# Patient Record
(demographics unavailable — no encounter records)

---

## 2024-10-17 NOTE — HISTORY OF PRESENT ILLNESS
[FreeTextEntry1] : 65-year-old male with a past medical history of diabetes complains of bilateral arm/hand numbness and tingling. He denies a fall.  He denies neck pain.  In the right arm he has tingling only in the hand and at the elbow.  On the left arm he has tingling from the shoulder to the hand. Use of the arms or hands does not increase his symptoms.  He does not take pain medication.  He does not have numbness in his feet

## 2024-10-17 NOTE — REVIEW OF SYSTEMS
[Fever] : no fever [Eye Pain] : no eye pain [Earache] : no earache [Chest Pain] : no chest pain [Shortness Of Breath] : no shortness of breath [Abdominal Pain] : no abdominal pain [Dysuria] : no dysuria [Joint Pain] : no joint pain [Skin Rash] : no skin rash [Headache] : no headaches [Suicidal] : not suicidal [Easy Bleeding] : no tendency for easy bleeding [de-identified] : Numbness and tingling in both arms

## 2024-10-17 NOTE — REVIEW OF SYSTEMS
[Fever] : no fever [Eye Pain] : no eye pain [Earache] : no earache [Chest Pain] : no chest pain [Shortness Of Breath] : no shortness of breath [Abdominal Pain] : no abdominal pain [Dysuria] : no dysuria [Joint Pain] : no joint pain [Skin Rash] : no skin rash [Headache] : no headaches [Suicidal] : not suicidal [Easy Bleeding] : no tendency for easy bleeding [de-identified] : Numbness and tingling in both arms

## 2024-10-17 NOTE — REVIEW OF SYSTEMS
[Fever] : no fever [Eye Pain] : no eye pain [Earache] : no earache [Chest Pain] : no chest pain [Shortness Of Breath] : no shortness of breath [Abdominal Pain] : no abdominal pain [Dysuria] : no dysuria [Joint Pain] : no joint pain [Skin Rash] : no skin rash [Headache] : no headaches [Suicidal] : not suicidal [Easy Bleeding] : no tendency for easy bleeding [de-identified] : Numbness and tingling in both arms

## 2024-10-17 NOTE — REVIEW OF SYSTEMS
[Fever] : no fever [Eye Pain] : no eye pain [Earache] : no earache [Chest Pain] : no chest pain [Shortness Of Breath] : no shortness of breath [Abdominal Pain] : no abdominal pain [Dysuria] : no dysuria [Joint Pain] : no joint pain [Skin Rash] : no skin rash [Headache] : no headaches [Suicidal] : not suicidal [Easy Bleeding] : no tendency for easy bleeding [de-identified] : Numbness and tingling in both arms

## 2024-10-17 NOTE — PHYSICAL EXAM
[FreeTextEntry1] : Pleasant, in no distress. Language: English HEENT: Head: no trauma. Eyes: no discharge. Ears: No discharge. Nose No discharge. Throat: clear Neck: FAROM. Negative Spurlings Heart: RR, +S1, S2 Lungs: CTA Abdomen: soft, NT Lumbar spine: FAROM, no spasm  LUE: Shoulder:FAROM, MS 5/5 Elbow: FAROM, MS 5/5 reflexes 2/4 negative Tinel's. Wrist: FAROM, MS 5/5 reflexes 2/4 negative Tinel's.  No hand wasting Warm, nontender, pulse 2+  RUE:Shoulder:FAROM, MS 5/5 Elbow: FAROM, MS 5/5 reflexes 2/4 negative Tinel's. Wrist: FAROM, MS 5/5 reflexes 2/4 negative Tinel's.  No hand wasting Warm, nontender, pulse 2+  LLE: Hip: FAROM, MS 5/5 Knee: FAROM, MS 5/5 reflexes 2/4 Ankle: FAROM, MS 5/5 reflexes 2/4 Warm , nontender, pulse 2+ negative homans  RLE: Hip: FAROM, MS 5/5 Knee: FAROM, MS 5/5 reflexes 2/4 Ankle: FAROM, MS 5/5 reflexes 2/4 Warm , nontender, pulse 2+ negative homans  Gait: Spontaneous, reciprocal, safe without an assistive device  Sensation RUE: sensation is intact to light touch, pinprick  and proprioception LUE: sensation is intact to light touch, pinprick  and proprioception RLE: sensation is intact to light touch, pinprick  and proprioception. Neg SLR. Neg JAKUB, Neg ARMINIR LLE: sensation is intact to light touch, pinprick  and proprioception. Neg SLR. Neg JAKUB, Neg FADIR

## 2024-11-12 NOTE — ASSESSMENT
[FreeTextEntry1] : Preliminary report  Impression: Diffuse upper extremity sensory neuropathy Bilateral carpal tunnel syndrome  Recommendations: Continue high potency B multivitamins with folic acid daily for nerve metabolism support Neurology evaluation to assist with the diagnosis of diffuse sensory neuropathy Patient would benefit from use of bilateral carpal tunnel splints.  Full report to follow

## 2024-11-15 NOTE — REVIEW OF SYSTEMS
[Fever] : no fever [Eye Pain] : no eye pain [Earache] : no earache [Chest Pain] : no chest pain [Shortness Of Breath] : no shortness of breath [Abdominal Pain] : no abdominal pain [Dysuria] : no dysuria [Joint Pain] : no joint pain [Skin Rash] : no skin rash [Headache] : no headaches [Suicidal] : not suicidal [Easy Bleeding] : no tendency for easy bleeding [de-identified] : Numbness and tingling in both arms

## 2024-11-15 NOTE — HISTORY OF PRESENT ILLNESS
[FreeTextEntry1] : 65-year-old male with a past medical history of diabetes complains of bilateral arm/hand numbness and tingling.  He denies a fall.  He denies neck pain.  In the right arm he has tingling only in the hand and at the elbow.  On the left arm he has tingling from the shoulder to the hand. Use of the arms or hands does not increase his symptoms.  He does not take pain medication.  He does not have numbness in his feet No EtOH.  He has a history of diabetes.  No thyroid disease.  Returns today to discuss the results of the EMG and further interventions. The EMG/NCV reveals diffuse sensory neuropathy and mild bilateral carpal tunnel syndrome

## 2024-11-15 NOTE — DATA REVIEWED
[EMG] : EMG [FreeTextEntry1] : EMG/NCV of the upper extremities performed on November 7, 2024 reveals diffuse sensory neuropathy and bilateral mild carpal tunnel syndrome

## 2024-11-15 NOTE — REVIEW OF SYSTEMS
[Fever] : no fever [Eye Pain] : no eye pain [Earache] : no earache [Chest Pain] : no chest pain [Shortness Of Breath] : no shortness of breath [Abdominal Pain] : no abdominal pain [Dysuria] : no dysuria [Joint Pain] : no joint pain [Skin Rash] : no skin rash [Headache] : no headaches [Suicidal] : not suicidal [Easy Bleeding] : no tendency for easy bleeding [de-identified] : Numbness and tingling in both arms

## 2024-11-18 NOTE — PLAN
[FreeTextEntry1] : Will encourage proper eating habits and identifying stressors/triggers for poor eating habits and implementing appropriate behavioral changes   RD Evaluation - [place]   Medical Weight Management Evaluation - Hi   Preoperative Medical evaluations - Primary Care Physician, Pulmonologist, Cardiologist, Behavioral Health   Preoperative Endoscopy   Investigations - Micronutrient Levels, Ultrasound  F/U once comprehensive assessment complete and dietary counseling sufficient to plan for surgery   45 minutes total spent with patients discussing importance of weight loss and path to bariatric surgery.

## 2024-11-18 NOTE — SOCIAL HISTORY
[] :  [# Of Children ___] : has [unfilled] children [FreeTextEntry1] : lives with his wife and his 20 y.o. 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 in [place] [FreeTextEntry5] : Witnessed civil war in [place]. Pt. endorsed some PTSD symptoms in the past but not currently.

## 2024-11-18 NOTE — HISTORY OF PRESENT ILLNESS
[Genetics] : genetics [Decrease Activity] : decrease activity [Poor Choices] : poor choices [de-identified] : Pt.'s motivation for surgery is to improve his health (pt. has HTN, DANNIE, and DM) and improve his mobility. He has difficulty bending down and working as a .  Per pt., his highest weight was 225 lbs. in January and his lowest weight was 99 lbs. when he was 22 y.o. His stated goal weight is 140-150 lbs. and he expresses intent to make behavioral and dietary changes towards obtaining optimal results.    [de-identified] : sleeve gastrectomy  [de-identified] : over 10 years; speaking with others who have had bariatric surgery; discussions with surgeon   [de-identified] : none.  Pt denies ED hx and bxs, including binge eating. [de-identified] : A current typical day of eating is reported as follows: B: (4:30 a.m). coffee, scooped out bagel with avacado, or corn tortilla with cheese S: (9:00 a.m.) coffee, every other week may have a bagel once during the week L: (between 12 and 2 p.m.) salmon with green bananas or green cassava; or chicken with salad from home; or orders a taco S: granola bar or cashews sometimes D: (between 6 and 7 p.m.) chicken or steak or fish and vegetables Drinks coffee, water, orange or cranberry juice sometimes His diet history is indicated as cutting portion sizes and eating low fat foods. Despite multiple attempts at diet and exercise modifications, patient reports inability to achieve significant weight loss.

## 2024-11-18 NOTE — DISCUSSION/SUMMARY
[Educational materials provided] : Educational materials provided [Behavior plan developed] : Behavior plan developed [Strategies to improve adherence identified] : Strategies to improve adherence identified [Addtnl Health & Behavior Intervention: Individual] : Additional Health and Behavior Intervention: Individual [Addtnl Health & Behavior Intervention: Group] : Additional Health and Behavior Intervention: Group [Develop and refine illness management to behavior plan] : Develop and refine illness management to behavior plan [Identify, practice refine strategies to promote adherence to regimen] : Identify, practice refine strategies to promote adherence to regimen [FreeTextEntry1] : Based on the information presented in this assessment, Mr. GARCIA does not have any current psychological contraindications for bariatric surgery.   [de-identified] : Psychological factors (poor dietary choices) affecting other medical conditions (obesity and associated medical sequelae) Obesity [FreeTextEntry3] : Behavioral strategies were discussed to increase his coping skills and assist him in making lifestyle modifications.  Provided psychoeducational materials on changing eating behaviors in preparation for surgery.  It was recommended that he attend the post-surgery group sessions for further education and support to assist him in making lifestyle changes.  Additionally, it was recommended that he utilize writer and RD as needed to assist in making pre-surgical and post-surgical dietary and behavioral changes.     [FreeTextEntry5] : compliance with dietary and behavioral recommendations [FreeTextEntry6] : reduction of obesity related co-morbidities; reduced risk of further medical sequelae of obesity

## 2024-11-18 NOTE — HISTORY OF PRESENT ILLNESS
[de-identified] : Initial Bariatric Surgery Evaluation   Patient is a  65 year  y/o male with a BMI of 36 and the following obesity related comorbidities: DM, HTN, HLD.  He has been struggling with weight for years and has failed multiple attempts at non-surgical weight loss options..  He  presents for bariatric surgery evaluation.   Current Weight: 205 Highest Weight in the last 5 years: 225  Lowest Weight in the last 5 years: 205  Symptoms of GERD: Occasional  Marital Status:  Children: 2 (2 grandchildren Work:     Exercise: limited but very active at work  Alcohol/Illicit Drugs/Tobacco: Denies

## 2024-11-18 NOTE — PHYSICAL EXAM
[Normal] : good [AH] : no auditory hallucinations [VH] : no visual hallucinations [Suicidal Ideation] : no suicidal ideation [Homicidal Ideation] : no homicidal ideation [FreeTextEntry8] : "ready for the day"

## 2024-11-18 NOTE — REASON FOR VISIT
[Other Location: e.g. Home (Enter Location, City,State)___] : at [unfilled] [Patient] : the patient [Initial Consult] : an initial consult for [Morbid Obesity (BMI<40)] : morbid obesity (bmi<40) [Referring By:  ___] : ~[address]~ was referred for psychological evaluation by Dr. PABLO [Attempted Weight Loss] : attempted weight loss [Commitment to Modified Lifestyle] : commitment to a modified lifestyle pre and post surgery [Difficulties with Diet Compliance] : difficulties with diet compliance  [Expectations of Outcome] : expectations of outcome [Motivation for Selecting Surgery] : motivation for selecting surgery [Strength of Social Support System] : strength of social support system [Patient Understands Data May be Shared] : patient understands that the information discussed during the evaluation would be shared with referring provider and possibly with ~his/her~ insurance provider [Other Location: e.g. School (Enter Location, City,State)___] : at [unfilled], at the time of the visit. [de-identified] : for evaluation of psychological appropriateness for bariatric surgery [de-identified] : Confidentiality and its limitations were explained.

## 2024-11-18 NOTE — PLAN
[FreeTextEntry1] : Will encourage proper eating habits and identifying stressors/triggers for poor eating habits and implementing appropriate behavioral changes   RD Evaluation - hSeridan   Medical Weight Management Evaluation - Hi   Preoperative Medical evaluations - Primary Care Physician, Pulmonologist, Cardiologist, Behavioral Health   Preoperative Endoscopy   Investigations - Micronutrient Levels, Ultrasound  F/U once comprehensive assessment complete and dietary counseling sufficient to plan for surgery   45 minutes total spent with patients discussing importance of weight loss and path to bariatric surgery.

## 2024-11-18 NOTE — ASSESSMENT
[FreeTextEntry1] : Patient is a 65 year y/o male  with a BMI of 36 and DM, HTN, HLD presents for bariatric surgery.  The different types of bariatric surgery were explained to him and the sleeve gastrectomy was deemed the best option.  The patient wishes to enter into our medically supervised weight loss program and undergo appropriate preoperative evaluation for bariatric surgery.

## 2024-11-18 NOTE — HISTORY OF PRESENT ILLNESS
[de-identified] : Initial Bariatric Surgery Evaluation   Patient is a  65 year  y/o male with a BMI of 36 and the following obesity related comorbidities: DM, HTN, HLD.  He has been struggling with weight for years and has failed multiple attempts at non-surgical weight loss options..  He  presents for bariatric surgery evaluation.   Current Weight: 205 Highest Weight in the last 5 years: 225  Lowest Weight in the last 5 years: 205  Symptoms of GERD: Occasional  Marital Status:  Children: 2 (2 grandchildren Work:     Exercise: limited but very active at work  Alcohol/Illicit Drugs/Tobacco: Denies

## 2024-11-19 NOTE — HISTORY OF PRESENT ILLNESS
[Improved Health] : Improved health [Quality of Life] : Quality of life [Improved Mobility] : Improved mobility [Middle Age (45-65)] : middle age (45-65) [Haven't tried anything yet] : haven't tried anything yet [Work stress] : work stress [7] : 7 [0-1 nights per week] : I use a CPAP machine 0-1 nights per week [I usually sleep 4-6 hours] : I usually sleep 4-6 hours [I snore] : I snore [2+ miles] : Walking distance capability: 2+ miles [FreeTextEntry2] : 205 [FreeTextEntry3] : 225 [] : No [FreeTextEntry1] : 65 year old male with T2DM, HLD, HTN, Fatty liver, DANNIE, LVH and Vit D def presents for evaluation of weight gain. Reports he began gaining weight when he stopped playing soccer about 20 years ago. He notes that he often skips meals d/t busy work schedule as a . He also does not have time for exercise d/t time constraints (has 2 hour commute each way). He does not know why he is gaining weight as he states his diet is pretty healthy. His wife makes food for him and he does not eat out often. He does have known DANNIE but does not want to use a CPAP.  Currently being treated for T2DM with Jardiance by PCP.   24 hr intake: B: bagel with javon or cheese coffee(black) L: skips or chicken/pork chop/fish salad (cabbage, tomato)  D: fried eggs beans or steak snack: fruit drink: water, coffee, OJ/cranberry juice

## 2024-11-19 NOTE — REASON FOR VISIT
[Home] : at home, [unfilled] , at the time of the visit. [Other Location: e.g. Home (Enter Location, City,State)___] : at [unfilled] [Patient] : the patient [Self] : self [Initial Eval - Existing Diagnosis] : an initial evaluation of an existing diagnosis

## 2024-11-19 NOTE — REVIEW OF SYSTEMS
[Patient Intake Form Reviewed] : Patient intake form was reviewed [Negative] : Allergic/Immunologic [MED-ROS-Cons-FT] : fatigue late afternoon [MED-ROS-Neuro-FT] : b/l numbness in b/l hand (carpal tunnel)

## 2024-11-19 NOTE — ASSESSMENT
[FreeTextEntry1] : Patient with multiple obesity related comorbidities. We discussed the importance of weight loss in the management of his comorbidities. We discussed the risks of continued excess weight on long term health.  We discussed at length the importance of following a carbohydrate balanced diet and the importance of incorporating protein in meals. We also discussed appropriate alternative food choices. Discussed importance of avoiding skipping meals. I have recommended he f/u with the RD for additional education. We discussed ways he can incorporate exercise into his daily routine. At least 15 minutes was spent during the visit on obesity counseling.  Discussed how his lifestyle choices are impacting his glucose, insulin and weight. Would recommend making sure he is eating 3 meals a day. Can consider Rybelsus (does not want injectable GLP1) to help with glucose and weight loss prior to surgery.   Stressed importance of following up with Pulm for f/u DANNIE. Discussed untreated DANNIE.   Emotional support provided. All of his questions were answered.  Patient to complete labs, will call with the results.   f/u in 2 weeks

## 2024-12-10 NOTE — HISTORY OF PRESENT ILLNESS
[Never] : never [TextBox_4] : TUAN GARCIA is a 65 year old male who presents for pre-op clearance bariatric surg not yet scheduled PMH: Never smoker, HTN, HLD< DM, history of jose not on machine  snoring+ no apneas no dry mouth no dyspnea

## 2024-12-31 NOTE — PHYSICAL EXAM
[Alert] : alert [Normal Voice/Communication] : normal voice/communication [Healthy Appearing] : healthy appearing [No Acute Distress] : no acute distress [Sclera] : the sclera and conjunctiva were normal [Hearing Threshold Finger Rub Not [place]] : hearing was normal [Normal Lips/Gums] : the lips and gums were normal [Oropharynx] : the oropharynx was normal [Normal Appearance] : the appearance of the neck was normal [No Neck Mass] : no neck mass was observed [No Respiratory Distress] : no respiratory distress [No Acc Muscle Use] : no accessory muscle use [Respiration, Rhythm And Depth] : normal respiratory rhythm and effort [Auscultation Breath Sounds / Voice Sounds] : lungs were clear to auscultation bilaterally [Heart Rate And Rhythm] : heart rate was normal and rhythm regular [Normal S1, S2] : normal S1 and S2 [Murmurs] : no murmurs [Bowel Sounds] : normal bowel sounds [Abdomen Tenderness] : non-tender [No Masses] : no abdominal mass palpated [Abdomen Soft] : soft [] : no hepatosplenomegaly [Oriented To Time, Place, And Person] : oriented to person, place, and time

## 2024-12-31 NOTE — HISTORY OF PRESENT ILLNESS
[FreeTextEntry1] : Thank you for consult.  Patient is a 64 y/o male with a PMHx of b/l carpal tunnel syndrome, Lt Ventricular Hypertrophy, Obesity, DANNIE, Type 2 Diabetes Mellitus, Umbilical Hernia, NAFLD, Colon Polyps, and Internal Hemorrhoids who presents for EGD consult for bariatric surgery clearance. Pt is currently f/u with his Bariatric Surgeon, Dr. Kenny Akers, as he prepares to have his sleeve gastrectomy. Pt is currently asymptomatic and feeling well. Last colonoscopy was 11/30/2021, which showed a 1 mm sigmoid polyp, what appeared to be a 20 mm lipoma in the sigmoid colon, and Internal hemorrhoids. Pathology came back as early inflammatory for the 1 mm polyp and as an early hyperplastic polyp for the biopsied "lipoma". Pt has never had an EGD.

## 2024-12-31 NOTE — ASSESSMENT
[FreeTextEntry1] : Referred pt for EGD.  The patient received education including the attached:   Nonalcoholic Fatty Liver Disease is an umbrella term for a range of liver conditions affecting people who drink little to no alcohol. As the name implies, the main characteristic of NAFLD is too much fat stored in liver cells. NAFLD is increasingly common around the world, especially in Western nations. In the [place], it is the most common form of chronic liver disease, affecting about one-quarter of the population. Some individuals with NAFLD can develop Nonalcoholic Steatohepatitis (NAYAK), an aggressive form of fatty liver disease, which is marked by liver inflammation and may progress to advanced scarring (Cirrhosis) and liver failure. This damage is similar to the damage caused by heavy alcohol use. Choose a healthy diet. Choose a healthy plant-based diet that's rich in fruits, vegetables, whole grains, and healthy fats. Maintain a healthy weight. If you are overweight or obese, reduce the number of calories you eat each day and get more exercise. If you have a healthy weight, work to maintain it by choosing a healthy diet and exercising. Exercise most days of the week. Get an OK from your doctor first if you haven't been exercising regularly.  An upper GI endoscopy or EGD (esophagogastroduodenoscopy) is a procedure to diagnose and treat problems in your upper GI (gastrointestinal) tract.  The upper GI tract includes your food pipe (esophagus), stomach, and the first part of your small intestine (the duodenum).  This procedure is done using a long, flexible tube called an endoscope. The tube has a tiny light and video camera on one end. The tube is put into your mouth and throat. Then it is slowly pushed through your esophagus and stomach, and into your duodenum. Video images from the tube are seen on a monitor.  Small tools may also be inserted into the endoscope. These tools can be used to:  Take tissue samples for a biopsy Remove things such as food that may be stuck in the upper GI tract Inject air or fluid Stop bleeding do procedures such as endoscopic surgery, laser therapy, or open (dilate) a narrowed area  I spent 45 minutes with the patient as well as reviewing documents prior to and after the office visit. Patient verbalized understanding of all information provided. All questions answered and reviewed.  Robert Brunner, MD

## 2025-02-06 NOTE — ASSESSMENT
[FreeTextEntry1] : Patient with multiple obesity related comorbidities. Continue to work on making lifestyle changes. Stressed importance of making lifestyle changes. I have recommended he f/u with the RD for additional education. We discussed ways he can incorporate exercise into his daily routine. At least 15 minutes was spent during the visit on obesity counseling.  At this time, he does not want to take medication to help with DM and weight loss.   Will continue f/u with Pulm  Emotional support provided. All of his questions were answered.  Patient to complete labs, will call with the results.   f/u in 3-4 months

## 2025-02-06 NOTE — HISTORY OF PRESENT ILLNESS
[FreeTextEntry1] : 65 year old male with T2DM, HLD, HTN, Fatty liver, DANNIE, LVH and Vit D def presents for evaluation of weight gain. Reports he began gaining weight when he stopped playing soccer about 20 years ago. He notes that he often skips meals d/t busy work schedule as a . He also does not have time for exercise d/t time constraints (has 2 hour commute each way). He does not know why he is gaining weight as he states his diet is pretty healthy. His wife makes food for him and he does not eat out often. He does have known DANNIE but does not want to use a CPAP.  Currently being treated for T2DM with Jardiance by PCP.   2/6/25 Since his last visit, he reports that he has been trying to work on lifestyle changes but with the holidays has had a hard time. He has not been exercising. Continues to have sugar cravings. He is down 4 lbs but feels this is due to being sick with the flu and not having much of an appetite. He was seen by Pulm and had a HST which showed severe DANNIE. He is pending EGD, but it is not covered by his ins at this time. He has new ins which is not offering the same comprehensive coverage. Therefore, he would like to put the bariatric pre-op process on hold until he figures out how to change his insurance.

## 2025-02-06 NOTE — PHYSICAL EXAM
[TextEntry] : General: Awake, alert, non- diaphoretic. In no acute distress. Well nourished. Head: Normocephalic Skin: No obvious bruises or rashes on hands and face. There are no typical cushingoid features. Eyes: No swelling, lid lag or orbitopathy. No conjunctival injection Thyroid: No obvious goiter Respiratory: no increased respiratory effort. Able to speak in clear sentences. Neurological: Speech normal rate. Psychiatric: normal affect. Cooperative Musculoskeletal: UE- free range of motion Extremities: No tremor.    [FreeTextEntry1] : General: Awake, alert, non- diaphoretic. In no acute distress. Well nourished. Head: Normocephalic Skin: No obvious bruises or rashes on hands and face. There are no typical cushingoid features. Eyes: No swelling, lid lag or orbitopathy. No conjunctival injection Thyroid: No obvious goiter Respiratory: no increased respiratory effort. Able to speak in clear sentences. Neurological: Speech normal rate. Psychiatric: normal affect. Cooperative Musculoskeletal: UE- free range of motion Extremities: No tremor.